# Patient Record
Sex: FEMALE | Race: AMERICAN INDIAN OR ALASKA NATIVE | NOT HISPANIC OR LATINO | Employment: UNEMPLOYED
[De-identification: names, ages, dates, MRNs, and addresses within clinical notes are randomized per-mention and may not be internally consistent; named-entity substitution may affect disease eponyms.]

---

## 2021-06-09 ENCOUNTER — TRANSCRIBE ORDERS (OUTPATIENT)
Dept: OTHER | Age: 26
End: 2021-06-09

## 2021-06-09 DIAGNOSIS — M25.561 PAIN OF RIGHT PATELLOFEMORAL JOINT: Primary | ICD-10-CM

## 2021-06-09 DIAGNOSIS — D18.09: ICD-10-CM

## 2021-12-25 ENCOUNTER — TRANSFERRED RECORDS (OUTPATIENT)
Dept: HEALTH INFORMATION MANAGEMENT | Facility: CLINIC | Age: 26
End: 2021-12-25
Payer: MEDICAID

## 2022-01-06 ENCOUNTER — TRANSCRIBE ORDERS (OUTPATIENT)
Dept: OTHER | Age: 27
End: 2022-01-06

## 2022-01-06 DIAGNOSIS — M25.561 PAIN OF RIGHT PATELLOFEMORAL JOINT: ICD-10-CM

## 2022-01-06 DIAGNOSIS — D18.09: Primary | ICD-10-CM

## 2022-01-20 ENCOUNTER — OFFICE VISIT (OUTPATIENT)
Dept: ORTHOPEDICS | Facility: CLINIC | Age: 27
End: 2022-01-20
Payer: MEDICAID

## 2022-01-20 ENCOUNTER — TELEPHONE (OUTPATIENT)
Dept: ORTHOPEDICS | Facility: CLINIC | Age: 27
End: 2022-01-20

## 2022-01-20 ENCOUNTER — TRANSFERRED RECORDS (OUTPATIENT)
Dept: HEALTH INFORMATION MANAGEMENT | Facility: CLINIC | Age: 27
End: 2022-01-20

## 2022-01-20 DIAGNOSIS — M25.561 PAIN OF RIGHT PATELLOFEMORAL JOINT: ICD-10-CM

## 2022-01-20 DIAGNOSIS — D18.09: ICD-10-CM

## 2022-01-20 PROCEDURE — 99203 OFFICE O/P NEW LOW 30 MIN: CPT | Mod: GC | Performed by: ORTHOPAEDIC SURGERY

## 2022-01-20 RX ORDER — MEDROXYPROGESTERONE ACETATE 150 MG/ML
150 INJECTION, SUSPENSION INTRAMUSCULAR
COMMUNITY

## 2022-01-20 NOTE — PROGRESS NOTES
Patient is a 27 yo female with right knee pain. Recent imaging shows findings consistent with a synovial hemangioma.  MRI my assessment she is having significant activity related symptoms.  She is interested in treatment which would include surgical excision.    On exam today her knee is not swollen.  She elicits tenderness along the medial aspect of the right knee joint.    MRI scan confirms the presence of what is most likely a benign vascular tumor involving the medial portion of the right knee extensively.    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    Plan: I recommend surgical treatment.  I discussed the risk and benefits.  She is aware of these.    Patient was seen with Dr. Colvin.  I agree with his assessment and plan.

## 2022-01-20 NOTE — LETTER
1/20/2022         RE: Marichuy Hernandez  [address]  [place] [address]        Dear Colleague,    Thank you for referring your patient, Marichuy Hernandez, to the [facility] [place]. Please see a copy of my visit note below.    Orthopaedic Surgery History & Physical  January 20, 2022    REFERRING PHYSICIAN: No ref. provider found   PRIMARY CARE PHYSICIAN: No primary care provider on file.     Chief Complaint:   Consult (hemangioma of right knee referred by Dr. Alexy Rosales at [facility] )      History of Present Illness:     Marichuy Hernandez is a 26 year old female who presents for evaluation of right knee pain.      X-rays were obtained of her knee which were negative.  MRI was obtained which demonstrated a likely hemangioma and she was referred to Dr. Jethro Pearl clinic today for this reason.    She has had intermittent right knee pain over the last 2years which is been gradually worsening over time.  The pain is worse when she is doing activities or with prolonged p.o., so activity such as going up and down stairs, lifting objects, or sitting in the car for an extended period of time.    Treatment to date has included Tylenol, ibuprofen, bracing.  She has not had any knee injections nor done any physical therapy.    Past Medical History:   No past medical history on file.    Past Surgical History:   No past surgical history on file.    Social History:     Social History     Tobacco Use     Smoking status: Not on file     Smokeless tobacco: Not on file   Substance Use Topics     Alcohol use: Not on file       Family History:   No family history on file.    Allergies:   No Known Allergies    Medications:     No current outpatient medications on file.     No current facility-administered medications for this visit.        Review of Systems:     A 10 point ROS was performed and reviewed. Specific responses to these questions are noted at the end of the  document.    Physical Exam:   Physical Exam Adult:  She walks normally without any abnormalities in gait.  Focused examination of the right lower extremity demonstrates no gross deformity with the skin intact.  There does not appear to be a large knee effusion.   Mild tenderness to palpation at the medial lateral joint lines, as well as around the patella.  Nontender at the tibial tubercle and patella tendon.  Nontender at the bony part of the patella itself.  Full symmetric range of motion  Fires EHL, FHL, GSC, TA.  Sensation grossly intact  Knee is grossly stable to varus, valgus stress, Lachman test, posterior drawer test.  Negative Swati test.    Imaging:   3 view X-ray of the right knee from 12/2021 demonstrates no osseous abnormalities, no fractures no dislocations.  Soft tissues are well-appearing, there may be a mild knee effusion visible on the lateral.    MRI of the right knee is notable for multiple foci of enhancing lesions within the knee joint itself which is continuous with the synovium.  Main locations are in the patellofemoral joint, as well as the posterior knee.  These are intense on T1 and T2 imaging.  Feeding vessels are appreciable.  Serpiginous appearance is present.  This is consistent with a vascular malformation.    Assessment and Plan:   Assessment:  26 year old female with right knee painful tumor x 2 years. MRI consistent with vascular malformation.    Discussed with the patient that based on her MRI findings this lesion is most likely benign.  She is interested in having the lesion removed in an effort to help improve her pain.  I discussed surgery in the form of open excisional biopsy.  Discussed risks of surgery including infection and bleeding.  Benefits of surgery include likely improvement in her right knee pain.  The patient does wish to proceed with the surgery.    Plan:   - MRI w/ and w/o contrast R thigh to look for additional tumors  - Will schedule patient for surgery for  open excisional biopsy knee lesion.    D/w Dr. Latoya Colvin MD  Orthopaedic Surgery, PGY-4  Pager: [phone]    Patient is a 25 yo female with right knee pain. Recent imaging shows findings consistent with a synovial hemangioma.  MRI my assessment she is having significant activity related symptoms.  She is interested in treatment which would include surgical excision.    On exam today her knee is not swollen.  She elicits tenderness along the medial aspect of the right knee joint.    MRI scan confirms the presence of what is most likely a benign vascular tumor involving the medial portion of the right knee extensively.    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    Plan: I recommend surgical treatment.  I discussed the risk and benefits.  She is aware of these.    Patient was seen with Dr. Colvin.  I agree with his assessment and plan.  Donny Klein MD

## 2022-01-20 NOTE — TELEPHONE ENCOUNTER
RN faxed the verification appt to the info below.    Fay Chong RN         Health Call Center    Phone Message    May a detailed message be left on voicemail: yes     Reason for Call Patient called asking for Appointment Verification ( Paper saying She has appointment Today )  for Today Appointment to be faxed to [phone] to [facility]lin Johnson Action Taken: Message routed to:  [facility] ([facility]    Travel Screening: Not Applicable

## 2022-01-20 NOTE — PROGRESS NOTES
Orthopaedic Surgery History & Physical  January 20, 2022    REFERRING PHYSICIAN: No ref. provider found   PRIMARY CARE PHYSICIAN: No primary care provider on file.     Chief Complaint:   Consult (hemangioma of right knee referred by Dr. Alexy Rosales at [facility] )      History of Present Illness:     Marichuy Hernandez is a 26 year old female who presents for evaluation of right knee pain.      X-rays were obtained of her knee which were negative.  MRI was obtained which demonstrated a likely hemangioma and she was referred to Dr. Jethro Pearl clinic today for this reason.    She has had intermittent right knee pain over the last 2years which is been gradually worsening over time.  The pain is worse when she is doing activities or with prolonged p.o., so activity such as going up and down stairs, lifting objects, or sitting in the car for an extended period of time.    Treatment to date has included Tylenol, ibuprofen, bracing.  She has not had any knee injections nor done any physical therapy.    Past Medical History:   No past medical history on file.    Past Surgical History:   No past surgical history on file.    Social History:     Social History     Tobacco Use     Smoking status: Not on file     Smokeless tobacco: Not on file   Substance Use Topics     Alcohol use: Not on file       Family History:   No family history on file.    Allergies:   No Known Allergies    Medications:     No current outpatient medications on file.     No current facility-administered medications for this visit.        Review of Systems:     A 10 point ROS was performed and reviewed. Specific responses to these questions are noted at the end of the document.    Physical Exam:   Physical Exam Adult:  She walks normally without any abnormalities in gait.  Focused examination of the right lower extremity demonstrates no gross deformity with the skin intact.  There does not appear to be a large knee effusion.   Mild tenderness to  palpation at the medial lateral joint lines, as well as around the patella.  Nontender at the tibial tubercle and patella tendon.  Nontender at the bony part of the patella itself.  Full symmetric range of motion  Fires EHL, FHL, GSC, TA.  Sensation grossly intact  Knee is grossly stable to varus, valgus stress, Lachman test, posterior drawer test.  Negative Swati test.    Imaging:   3 view X-ray of the right knee from 12/2021 demonstrates no osseous abnormalities, no fractures no dislocations.  Soft tissues are well-appearing, there may be a mild knee effusion visible on the lateral.    MRI of the right knee is notable for multiple foci of enhancing lesions within the knee joint itself which is continuous with the synovium.  Main locations are in the patellofemoral joint, as well as the posterior knee.  These are intense on T1 and T2 imaging.  Feeding vessels are appreciable.  Serpiginous appearance is present.  This is consistent with a vascular malformation.    Assessment and Plan:   Assessment:  26 year old female with right knee painful tumor x 2 years. MRI consistent with vascular malformation.    Discussed with the patient that based on her MRI findings this lesion is most likely benign.  She is interested in having the lesion removed in an effort to help improve her pain.  I discussed surgery in the form of open excisional biopsy.  Discussed risks of surgery including infection and bleeding.  Benefits of surgery include likely improvement in her right knee pain.  The patient does wish to proceed with the surgery.    Plan:   - MRI w/ and w/o contrast R thigh to look for additional tumors  - Will schedule patient for surgery for open excisional biopsy knee lesion.    D/w Dr. Latoya Colvin MD  Orthopaedic Surgery, PGY-4  Pager: [phone]

## 2022-01-21 ENCOUNTER — TELEPHONE (OUTPATIENT)
Dept: ORTHOPEDICS | Facility: CLINIC | Age: 27
End: 2022-01-21
Payer: MEDICAID

## 2022-01-25 ENCOUNTER — TELEPHONE (OUTPATIENT)
Dept: ORTHOPEDICS | Facility: CLINIC | Age: 27
End: 2022-01-25
Payer: COMMERCIAL

## 2022-01-25 NOTE — TELEPHONE ENCOUNTER
RN called and unable to leave a voice message for patient.  Fabio Calles has the orders but she is not scheduled Yet.

## 2022-01-25 NOTE — TELEPHONE ENCOUNTER
RN called and spoke with imaging department.  They have the orders but the patient has not been scheduled yet.

## 2022-01-25 NOTE — TELEPHONE ENCOUNTER
Attempted to reach patient about scheduling surgery with Dr. Klein. There was no answer and no message was left because voicemail was not set up.

## 2022-01-26 ENCOUNTER — TRANSFERRED RECORDS (OUTPATIENT)
Dept: HEALTH INFORMATION MANAGEMENT | Facility: CLINIC | Age: 27
End: 2022-01-26
Payer: COMMERCIAL

## 2022-01-26 NOTE — TELEPHONE ENCOUNTER
Second attempted to reach patient about scheduling surgery with Dr. Klein. There was no answer and no message was left because voicemail was not set up.

## 2022-02-02 ENCOUNTER — PREP FOR PROCEDURE (OUTPATIENT)
Dept: ORTHOPEDICS | Facility: CLINIC | Age: 27
End: 2022-02-02
Payer: COMMERCIAL

## 2022-02-04 PROBLEM — D18.09: Status: ACTIVE | Noted: 2022-02-04

## 2022-02-04 NOTE — TELEPHONE ENCOUNTER
Patient is scheduled for surgery with Dr. Klein    Spoke with: Marichuy    Date of Surgery: 2/25/2022    Location: [facility]    Informed patient they will need an adult  yes    Pre op with Provider n/a    H&P: Completed by PCP on 1/31/2022    Pre-procedure COVID-19 Test: patient will do this locally    Additional imaging/appointments: n/a    Surgery packet: Given in clinic     Additional comments: n/a

## 2022-02-06 RX ORDER — CEFAZOLIN SODIUM 2 G/50ML
2 SOLUTION INTRAVENOUS SEE ADMIN INSTRUCTIONS
Status: CANCELLED | OUTPATIENT
Start: 2022-02-06

## 2022-02-06 RX ORDER — CEFAZOLIN SODIUM 2 G/50ML
2 SOLUTION INTRAVENOUS
Status: CANCELLED | OUTPATIENT
Start: 2022-02-06

## 2022-02-07 DIAGNOSIS — Z11.59 ENCOUNTER FOR SCREENING FOR OTHER VIRAL DISEASES: Primary | ICD-10-CM

## 2022-02-18 ENCOUNTER — TELEPHONE (OUTPATIENT)
Dept: ORTHOPEDICS | Facility: CLINIC | Age: 27
End: 2022-02-18
Payer: COMMERCIAL

## 2022-02-18 NOTE — LETTER
Verification of Appointment  2022     Seen today: no    Patient:  Marichuy Hernandez  :   1995  MRN:     [id number]  Physician: TERESA KLEIN    The next appointment is scheduled for 22.  At this time she will be having a surgery which is: Removal of right knee tumor with Dr. Klein.  She will have a 2 week post op check with us as well on  22.      If you have any further questions or concerns, please call me at [phone].        Electronically signed by Teresa Klein MD

## 2022-02-18 NOTE — TELEPHONE ENCOUNTER
ATTN:  [facility]     Health Call Center    Phone Message:  Pt would like a CALL BACK from [facility] to address her appointment verification, why pt is having surgery, and recovery time.      May a detailed message be left on voicemail: Yes     Reason for Call: Other: Surgery Verification & Recovery Time:  CALL BACK     Action Taken: Message routed to:  [facility] Team to [facility]    Travel Screening: Not Applicable

## 2022-02-21 ENCOUNTER — TELEPHONE (OUTPATIENT)
Dept: ORTHOPEDICS | Facility: CLINIC | Age: 27
End: 2022-02-21
Payer: COMMERCIAL

## 2022-02-21 NOTE — TELEPHONE ENCOUNTER
RN returned call to Marichuy but was unable to leave a voice message as her mailbox had not been set up yet.

## 2022-02-22 ENCOUNTER — TELEPHONE (OUTPATIENT)
Dept: ORTHOPEDICS | Facility: CLINIC | Age: 27
End: 2022-02-22
Payer: COMMERCIAL

## 2022-02-22 NOTE — TELEPHONE ENCOUNTER
RN called and spoke with Marichuy.  She is asking for help for gas money to come down here for surgery.  She is asking us for an appointment verification to be sent to her for that reason.  RN will do a note and send to her.  Address confirmed in system.  She has no other questions at this time.

## 2022-02-25 ENCOUNTER — HOSPITAL ENCOUNTER (OUTPATIENT)
Facility: AMBULATORY SURGERY CENTER | Age: 27
End: 2022-02-25
Attending: ORTHOPAEDIC SURGERY
Payer: COMMERCIAL

## 2022-02-25 DIAGNOSIS — D18.09: ICD-10-CM

## 2022-12-06 ENCOUNTER — MEDICAL CORRESPONDENCE (OUTPATIENT)
Dept: HEALTH INFORMATION MANAGEMENT | Facility: CLINIC | Age: 27
End: 2022-12-06

## 2022-12-20 ENCOUNTER — TRANSCRIBE ORDERS (OUTPATIENT)
Dept: OTHER | Age: 27
End: 2022-12-20

## 2022-12-20 DIAGNOSIS — M25.561 PATELLAR PAIN, RIGHT: Primary | ICD-10-CM

## 2023-01-12 ENCOUNTER — TELEPHONE (OUTPATIENT)
Dept: ORTHOPEDICS | Facility: CLINIC | Age: 28
End: 2023-01-12

## 2023-01-12 NOTE — LETTER
[address]  [address]  Tel. [phone]    January 12, 2023      To Whom It May Concern:     Marichuy Mary has an upcoming appointment scheduled with me on 1/16/23.     If you have any questions or concerns, please don't hesitate to call.        Sincerely,     Molina Gastelum, PAC

## 2023-01-12 NOTE — TELEPHONE ENCOUNTER
[facility] Call [facility]    Phone Message    May a detailed message be left on voicemail: yes     Reason for Call Patient need a Verification Note to be Fax to [phone] to [facility]. Saying She has a upcoming Appt coming up on Jan 16  Action Taken: Message routed to:  Other: [facility]    Travel Screening: Not Applicable

## 2023-01-12 NOTE — TELEPHONE ENCOUNTER
Letter was written and faxed to [facility] at [phone] per patient request.    Dorinda Mckenna RN   [facility]

## 2023-01-16 ENCOUNTER — ANCILLARY PROCEDURE (OUTPATIENT)
Dept: GENERAL RADIOLOGY | Facility: CLINIC | Age: 28
End: 2023-01-16
Attending: PHYSICIAN ASSISTANT
Payer: COMMERCIAL

## 2023-01-16 ENCOUNTER — OFFICE VISIT (OUTPATIENT)
Dept: ORTHOPEDICS | Facility: CLINIC | Age: 28
End: 2023-01-16
Payer: COMMERCIAL

## 2023-01-16 VITALS
WEIGHT: 112 LBS | SYSTOLIC BLOOD PRESSURE: 94 MMHG | BODY MASS INDEX: 19.84 KG/M2 | DIASTOLIC BLOOD PRESSURE: 60 MMHG | HEIGHT: 63 IN

## 2023-01-16 DIAGNOSIS — D18.09: Primary | ICD-10-CM

## 2023-01-16 DIAGNOSIS — M25.561 RIGHT KNEE PAIN: ICD-10-CM

## 2023-01-16 PROCEDURE — 73564 X-RAY EXAM KNEE 4 OR MORE: CPT | Mod: TC | Performed by: RADIOLOGY

## 2023-01-16 PROCEDURE — 99203 OFFICE O/P NEW LOW 30 MIN: CPT | Performed by: PHYSICIAN ASSISTANT

## 2023-01-16 ASSESSMENT — PAIN SCALES - GENERAL: PAINLEVEL: MODERATE PAIN (4)

## 2023-01-16 NOTE — PROGRESS NOTES
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  She used to play track in school.  Her significant other's name is Lori    She is being seen for   Chief Complaints and History of Present Illnesses   Patient presents with     Knee Pain     Right knee pain     Consult         History of Present Illness:   Mechanism of Injury: No major injury fall or trauma that she can recall.  Location: Right medial knee  Duration of Pain: Patient states 18 years  Rating of Pain: 5 out of 10  Pain Quality: Achy but can be sharp  Pain is better with: Rest  Pain is worse with: Activity or pushing on a very specific spot.  Treatment so far consists of: No formal physical therapy yet, Tylenol that has not helped all that much, ibuprofen 800 mg that has not helped.  Patient was set up to have benign vascular tumor on the medial side of the knee removed by Dr. Klein but had car troubles and did not get it done.   Associated Features: Denies numbness or tingling shooting burning electric pain.  Patient states that sometimes her knee locks and sometimes she gets pinching in her knee and sometimes she gets swelling and instability in her knee.  Patient gets pain when she is sitting and she has noticed AM stiffness she states.  Prior history of related problems: I was able to find in the notes that the patient did have a right knee tumor removal on 2/25/2022 by Dr. Klein.  She had a benign vascular tumor on the medial side of the knee.  Pain is Limiting: Comfort  Here to: Orthopedic consultation  The Pain Has: Been about the same  Additional History: Patient wants to get set up to see Dr. Klein again as he figured out what was going on with her knee and would like to have the surgery she was set up to do February 2022.      Patient's past medical, surgical, social and family histories reviewed.     No  "past medical history on file.     No past surgical history on file.    Medications:  medroxyPROGESTERone (DEPO-PROVERA) 150 MG/ML IM injection, Inject 150 mg into the muscle every 3 months    No current facility-administered medications on file prior to visit.      No Known Allergies    Social History     Occupational History     Not on file   Tobacco Use     Smoking status: Former     Types: Vaping Device     Smokeless tobacco: Current   Substance and Sexual Activity     Alcohol use: Never     Drug use: Yes     Types: Marijuana     Sexual activity: Not on file     No pertinent family history     REVIEW OF SYSTEMS  10 point review systems performed otherwise negative as noted as per history of present illness.    Physical Exam:  Vitals: BP 94/60   Ht 1.588 m (5' 2.5\")   Wt 50.8 kg (112 lb)   BMI 20.16 kg/m    BMI= Body mass index is 20.16 kg/m .    Constitutional: healthy, alert and no acute distress   Psychiatric: mentation appears normal and affect normal/bright  NEURO: no focal deficits, CMS intact right lower extremity   RESP: Normal with easy respirations and no use of accessory muscles to breathe, no audible wheezing or retractions  CV: +2 radial pulse and her hand is warm to palpation.   SKIN: No erythema, rashes, excoriation, or breakdown. No evidence of infection.   MUSCULOSKELETAL:    INSPECTION of right knee: No gross deformities, erythema, edema, ecchymosis, atrophy or fasciculations.     PALPATION: No tenderness medial, lateral, anterior and posterior portion of the knee. No specific joint line tenderness. No increased warmth.  No effusion.     ROM: Passive: Extension full, flexion to 125 . All range of motion without catching, locking or pain.       STRENGTH: 5 out of 5 quad and hamstring.     SPECIAL TEST: Patient has a negative Lachman's negative drawer sign. Patient's knee is stable to varus and valgus stress at 30  of flexion. Patient has a negative Everardo's.   GAIT: non-antalgic  Lymph: no " palpable lymph nodes    Diagnostic Modalities:  X-rays done today showing mild joint space narrowing in the medial compartment and patellofemoral.  More of the patellofemoral wear is on the lateral facet bilaterally.  No fracture no dislocation no tumor and good alignment.    Independent visualization of the images was performed.    Impression: 1.  Right knee hemangioma under the medial patella tendon.  2.  Removal of right knee tumor by Dr. Klein which did not happen on 2/25/2022.    Plan:  All of the above pertinent physical exam and imaging modalities findings was reviewed with Mercedes and her significant other Kaylin    FOCUSED PLAN:  27-year-old female with right knee benign vascular tumor on the medial patella tendon area who was set up to have this tumor removed by Dr. Klein on 2/25/2022 however she had car trouble and did not make it.  Patient states that the pain has continued and she has had this pain for about 18 years.  She would like to get set up with Dr. Klein again to possibly have this tumor removed.  I put in a Ortho  referral to Dr. Klein today.  Follow-up on an as-needed basis.    Re-x-ray on return: No      This note was dictated with Sellsy.    Frederic Gastelum PA-C

## 2023-01-16 NOTE — LETTER
1/16/2023         RE: Marichuy Hernandez  [address]  [facility] [address]        Dear Colleague,    Thank you for referring your patient, Marichuy Hernandez, to the [facility]. Please see a copy of my visit note below.    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  She used to play track in school.  Her significant other's name is Lori    She is being seen for   Chief Complaints and History of Present Illnesses   Patient presents with     Knee Pain     Right knee pain     Consult         History of Present Illness:   Mechanism of Injury: No major injury fall or trauma that she can recall.  Location: Right medial knee  Duration of Pain: Patient states 18 years  Rating of Pain: 5 out of 10  Pain Quality: Achy but can be sharp  Pain is better with: Rest  Pain is worse with: Activity or pushing on a very specific spot.  Treatment so far consists of: No formal physical therapy yet, Tylenol that has not helped all that much, ibuprofen 800 mg that has not helped.  Patient was set up to have benign vascular tumor on the medial side of the knee removed by Dr. Klein but had car troubles and did not get it done.   Associated Features: Denies numbness or tingling shooting burning electric pain.  Patient states that sometimes her knee locks and sometimes she gets pinching in her knee and sometimes she gets swelling and instability in her knee.  Patient gets pain when she is sitting and she has noticed AM stiffness she states.  Prior history of related problems: I was able to find in the notes that the patient did have a right knee tumor removal on 2/25/2022 by Dr. Klein.  She had a benign vascular tumor on the medial side of the knee.  Pain is Limiting: Comfort  Here to: Orthopedic consultation  The Pain Has: Been about the same  Additional History:  "Patient wants to get set up to see Dr. Klein again as he figured out what was going on with her knee and would like to have the surgery she was set up to do February 2022.      Patient's past medical, surgical, social and family histories reviewed.     No past medical history on file.     No past surgical history on file.    Medications:  medroxyPROGESTERone (DEPO-PROVERA) 150 MG/ML IM injection, Inject 150 mg into the muscle every 3 months    No current facility-administered medications on file prior to visit.      No Known Allergies    Social History     Occupational History     Not on file   Tobacco Use     Smoking status: Former     Types: Vaping Device     Smokeless tobacco: Current   Substance and Sexual Activity     Alcohol use: Never     Drug use: Yes     Types: Marijuana     Sexual activity: Not on file     No pertinent family history     REVIEW OF SYSTEMS  10 point review systems performed otherwise negative as noted as per history of present illness.    Physical Exam:  Vitals: BP 94/60   Ht 1.588 m (5' 2.5\")   Wt 50.8 kg (112 lb)   BMI 20.16 kg/m    BMI= Body mass index is 20.16 kg/m .    Constitutional: healthy, alert and no acute distress   Psychiatric: mentation appears normal and affect normal/bright  NEURO: no focal deficits, CMS intact right lower extremity   RESP: Normal with easy respirations and no use of accessory muscles to breathe, no audible wheezing or retractions  CV: +2 radial pulse and her hand is warm to palpation.   SKIN: No erythema, rashes, excoriation, or breakdown. No evidence of infection.   MUSCULOSKELETAL:    INSPECTION of right knee: No gross deformities, erythema, edema, ecchymosis, atrophy or fasciculations.     PALPATION: No tenderness medial, lateral, anterior and posterior portion of the knee. No specific joint line tenderness. No increased warmth.  No effusion.     ROM: Passive: Extension full, flexion to 125 . All range of motion without catching, locking or pain.  "      STRENGTH: 5 out of 5 quad and hamstring.     SPECIAL TEST: Patient has a negative Lachman's negative drawer sign. Patient's knee is stable to varus and valgus stress at 30  of flexion. Patient has a negative Everardo's.   GAIT: non-antalgic  Lymph: no palpable lymph nodes    Diagnostic Modalities:  X-rays done today showing mild joint space narrowing in the medial compartment and patellofemoral.  More of the patellofemoral wear is on the lateral facet bilaterally.  No fracture no dislocation no tumor and good alignment.    Independent visualization of the images was performed.    Impression: 1.  Right knee hemangioma under the medial patella tendon.  2.  Removal of right knee tumor by Dr. Klein which did not happen on 2/25/2022.    Plan:  All of the above pertinent physical exam and imaging modalities findings was reviewed with Mercedes and her significant other Kaylin    FOCUSED PLAN:  27-year-old female with right knee benign vascular tumor on the medial patella tendon area who was set up to have this tumor removed by Dr. Klein on 2/25/2022 however she had car trouble and did not make it.  Patient states that the pain has continued and she has had this pain for about 18 years.  She would like to get set up with Dr. Klein again to possibly have this tumor removed.  I put in a Ortho  referral to Dr. Klein today.  Follow-up on an as-needed basis.    Re-x-ray on return: No      This note was dictated with [facility].    Frederic Gastelum PA-C        Again, thank you for allowing me to participate in the care of your patient.        Sincerely,        Frederic Gastelum PA-C

## 2023-01-18 ENCOUNTER — TELEPHONE (OUTPATIENT)
Dept: ORTHOPEDICS | Facility: CLINIC | Age: 28
End: 2023-01-18
Payer: COMMERCIAL

## 2023-01-18 NOTE — TELEPHONE ENCOUNTER
Health Call Center    Phone Message    May a detailed message be left on voicemail: yes     Reason for Call: Other: Hello,needs to be with DR. KLEIN per Molina Gastelum PA-C, patient was set up on 2/25/2022 to have removal of right knee tumor which she did not have and would like to get set up with Dr. Klein again.. Can you please review and let me know if patient will need a new consult or if the procedure and get scheduled? Thank you     Action Taken: Other: [facility] ortho    Travel Screening: Not Applicable

## 2023-01-20 NOTE — TELEPHONE ENCOUNTER
Attempted to reach patient.  Patient is not available at this time.  Her girlfriend has the phone at work.  Left message that Dr. Klein's office is calling and that I will try back on Monday.

## 2023-01-23 NOTE — TELEPHONE ENCOUNTER
Contacted patient.  Patient was advised that it has been referred to follow pu with Dr. Klein.  Since patient lives in [place], [place] she scheduled a video visit with Dr. Klein on 1/31/23 at 145 pm.  A link was texted to her cell phone to sign up for [facility].  Patient will give us a call if she is unable to sign up successfully for MyChart. Patient had no further questions or concerns at this time.

## 2023-01-31 ENCOUNTER — VIRTUAL VISIT (OUTPATIENT)
Dept: ORTHOPEDICS | Facility: CLINIC | Age: 28
End: 2023-01-31
Payer: COMMERCIAL

## 2023-01-31 DIAGNOSIS — D18.09: ICD-10-CM

## 2023-01-31 PROCEDURE — 99213 OFFICE O/P EST LOW 20 MIN: CPT | Mod: 95 | Performed by: ORTHOPAEDIC SURGERY

## 2023-01-31 NOTE — LETTER
1/31/2023         RE: Marichuy Hernandez  [address]  [facility] [address]        Dear Colleague,    Thank you for referring your patient, Marichuy Hernandez, to the [facility] [place]. Please see a copy of my visit note below.    Diagnosis: Synovial hemangioma right knee, untreated    I met virtually with Marichuy today.  This meeting was by video.  She reports increased discomfort in her right knee particularly with flexing the knee.  She now describes tenderness about the knee as well as locking when she for example gets out of a car.  She denies any recent injuries.    I reviewed her old MRI scan she does have findings which support the diagnosis of synovial hemangioma.  This is to date untreated.    Impression: Persistent symptoms of the right knee synovial hemangioma with possible overlay of meniscal pathology or other intra-articular disease.    Plan: 1.  MRI scan of the right knee to evaluate for synovial hemangioma as well as intra-articular disease.  MRI scan performed in the [place] area.  April to arrange for the MRI.  Contrast is not needed   2.  Pieter will set up the video visit after we have obtained the MRI scan.  Began at 1:54 PM and ended at 2:07 PM.  The total visit was 13 minutes          Donny Klein MD

## 2023-01-31 NOTE — NURSING NOTE
Marichuy is a 27 year old who is being evaluated via a billable video visit.      How would you like to obtain your AVS? MyChart  If the video visit is dropped, the invitation should be resent by: Text to cell phone: [phone]  Will anyone else be joining your video visit? No

## 2023-01-31 NOTE — PATIENT INSTRUCTIONS
Impression: Persistent symptoms of the right knee synovial hemangioma with possible overlay of meniscal pathology or other intra-articular disease.    Plan: 1.  MRI scan of the right knee to evaluate for synovial hemangioma as well as intra-articular disease.  MRI scan performed in the [place] area.  April to arrange for the MRI.  Contrast is not needed   2.  Pieter will set up the video visit after we have obtained the MRI scan.

## 2023-01-31 NOTE — PROGRESS NOTES
Diagnosis: Synovial hemangioma right knee, untreated    I met virtually with Marichuy today.  This meeting was by video.  She reports increased discomfort in her right knee particularly with flexing the knee.  She now describes tenderness about the knee as well as locking when she for example gets out of a car.  She denies any recent injuries.    I reviewed her old MRI scan she does have findings which support the diagnosis of synovial hemangioma.  This is to date untreated.    Impression: Persistent symptoms of the right knee synovial hemangioma with possible overlay of meniscal pathology or other intra-articular disease.    Plan: 1.  MRI scan of the right knee to evaluate for synovial hemangioma as well as intra-articular disease.  MRI scan performed in the [place] area.  April to arrange for the MRI.  Contrast is not needed   2.  Pieter will set up the video visit after we have obtained the MRI scan.  Began at 1:54 PM and ended at 2:07 PM.  The total visit was 13 minutes

## 2023-02-11 ENCOUNTER — HEALTH MAINTENANCE LETTER (OUTPATIENT)
Age: 28
End: 2023-02-11

## 2023-02-21 ENCOUNTER — VIRTUAL VISIT (OUTPATIENT)
Dept: ORTHOPEDICS | Facility: CLINIC | Age: 28
End: 2023-02-21
Payer: COMMERCIAL

## 2023-02-21 DIAGNOSIS — D18.09: Primary | ICD-10-CM

## 2023-02-21 PROCEDURE — 99214 OFFICE O/P EST MOD 30 MIN: CPT | Mod: VID | Performed by: ORTHOPAEDIC SURGERY

## 2023-02-21 NOTE — PATIENT INSTRUCTIONS
Impression: Symptomatic right knee is presumed synovial hemangioma in the knee along with extension into the popliteal fossa and most likely in the VMO.    Plan: 1.  Flakita to inform the patient of the requirements prior to surgery.  2.  Amalia to schedule an outpatient surgery.  Case request form is completed.

## 2023-02-21 NOTE — NURSING NOTE
Attempted to reach patient for pre surgery education teaching.  Unable to leave a voicemail message, no voicemail set up.  Surgery packet sent in the mail to the patient's address in [facility].

## 2023-02-21 NOTE — PROGRESS NOTES
Diagnosis: Synovial hemangioma right knee, untreated     I met with the patient by video to discuss her MRI scan.  Her recent scan shows no significant changes compared to prior scans and that there is a 4 x 4.6 x 10 cm mass that is described involving primarily the anterior and anteromedial aspect of her knee but also somewhat surprisingly in the posterior aspect.    I reviewed the MRI myself.  It could be that her VMO muscle actually is the source of the tumor and is extended into the knee joint as well as into the popliteal fossa.  I discussed this with her.  It is quite clear that she would like to proceed with surgical treatment.  I think this is my recommendation as well.    I did describe the potential complications from surgery including infection tumor recurrence postoperative bleeding and she understands all of these and is excepting of the risks.  All her questions were answered.    Impression: Symptomatic right knee is presumed synovial hemangioma in the knee along with extension into the popliteal fossa and most likely in the VMO.    Plan: 1.  Flakita to inform the patient of the requirements prior to surgery.  2.  Amalia to schedule an outpatient surgery.  Case request form is completed.    This is a video visit.  I reviewed the imaging from 1:52 PM to 1:57 PM.  The video portion of the visit was from 1:58 PM to 2:08 PM.  Total visit was 15 minutes for an established patient.

## 2023-02-21 NOTE — NURSING NOTE
Is the patient currently in the state of [place]? YES    Visit mode:VIDEO    If the visit is dropped, the patient can be reconnected by: VIDEO VISIT: Text to cell phone: [phone]    Will anyone else be joining the visit? NO      How would you like to obtain your AVS? MyChart    Are changes needed to the allergy or medication list? NO    Reason for visit: Follow up after MRI    Shelby Kocher

## 2023-02-21 NOTE — LETTER
2/21/2023         RE: Marichuy Hernandez  [address]  [facility] [address]        Dear Colleague,    Thank you for referring your patient, Marichuy Hernandez, to the [facility] [place]. Please see a copy of my visit note below.    Diagnosis: Synovial hemangioma right knee, untreated     I met with the patient by video to discuss her MRI scan.  Her recent scan shows no significant changes compared to prior scans and that there is a 4 x 4.6 x 10 cm mass that is described involving primarily the anterior and anteromedial aspect of her knee but also somewhat surprisingly in the posterior aspect.    I reviewed the MRI myself.  It could be that her VMO muscle actually is the source of the tumor and is extended into the knee joint as well as into the popliteal fossa.  I discussed this with her.  It is quite clear that she would like to proceed with surgical treatment.  I think this is my recommendation as well.    I did describe the potential complications from surgery including infection tumor recurrence postoperative bleeding and she understands all of these and is excepting of the risks.  All her questions were answered.    Impression: Symptomatic right knee is presumed synovial hemangioma in the knee along with extension into the popliteal fossa and most likely in the VMO.    Plan: 1.  Flakita to inform the patient of the requirements prior to surgery.  2.  Amalia to schedule an outpatient surgery.  Case request form is completed.    This is a video visit.  I reviewed the imaging from 1:52 PM to 1:57 PM.  The video portion of the visit was from 1:58 PM to 2:08 PM.  Total visit was 15 minutes for an established patient.      Donny Klein MD

## 2023-02-22 ENCOUNTER — TELEPHONE (OUTPATIENT)
Dept: ORTHOPEDICS | Facility: CLINIC | Age: 28
End: 2023-02-22
Payer: COMMERCIAL

## 2023-02-22 NOTE — TELEPHONE ENCOUNTER
Patient is scheduled for surgery with Dr. Klein    Spoke with: Marichuy    Date of Surgery: 3/17/23    Location: [facility]    Informed patient they will need an adult  Yes    H&P: Scheduled with PCP    Pre-procedure COVID-19 Test: N/A    Additional imaging/appointments: POP Made    Surgery packet: Mailed    Additional comments: N/A

## 2023-03-03 ENCOUNTER — TRANSFERRED RECORDS (OUTPATIENT)
Dept: HEALTH INFORMATION MANAGEMENT | Facility: CLINIC | Age: 28
End: 2023-03-03
Payer: COMMERCIAL

## 2023-03-16 ENCOUNTER — ANESTHESIA EVENT (OUTPATIENT)
Dept: SURGERY | Facility: AMBULATORY SURGERY CENTER | Age: 28
End: 2023-03-16
Payer: COMMERCIAL

## 2023-03-17 ENCOUNTER — ANESTHESIA (OUTPATIENT)
Dept: SURGERY | Facility: AMBULATORY SURGERY CENTER | Age: 28
End: 2023-03-17
Payer: COMMERCIAL

## 2023-03-17 ENCOUNTER — HOSPITAL ENCOUNTER (OUTPATIENT)
Facility: AMBULATORY SURGERY CENTER | Age: 28
Discharge: HOME | End: 2023-03-17
Attending: ORTHOPAEDIC SURGERY
Payer: COMMERCIAL

## 2023-03-17 VITALS
WEIGHT: 117 LBS | TEMPERATURE: 97.5 F | BODY MASS INDEX: 20.73 KG/M2 | HEART RATE: 84 BPM | RESPIRATION RATE: 16 BRPM | HEIGHT: 63 IN | SYSTOLIC BLOOD PRESSURE: 107 MMHG | DIASTOLIC BLOOD PRESSURE: 64 MMHG | OXYGEN SATURATION: 97 %

## 2023-03-17 DIAGNOSIS — D18.09: Primary | ICD-10-CM

## 2023-03-17 LAB
HCG UR QL: NEGATIVE
INTERNAL QC OK POCT: NORMAL
POCT KIT EXPIRATION DATE: NORMAL
POCT KIT LOT NUMBER: NORMAL

## 2023-03-17 PROCEDURE — 88307 TISSUE EXAM BY PATHOLOGIST: CPT | Mod: TC | Performed by: ORTHOPAEDIC SURGERY

## 2023-03-17 PROCEDURE — 27334 REMOVE KNEE JOINT LINING: CPT | Mod: RT

## 2023-03-17 PROCEDURE — 88307 TISSUE EXAM BY PATHOLOGIST: CPT | Mod: 26 | Performed by: PATHOLOGY

## 2023-03-17 PROCEDURE — 81025 URINE PREGNANCY TEST: CPT | Performed by: PATHOLOGY

## 2023-03-17 PROCEDURE — 27334 REMOVE KNEE JOINT LINING: CPT | Mod: RT | Performed by: ORTHOPAEDIC SURGERY

## 2023-03-17 RX ORDER — KETOROLAC TROMETHAMINE 30 MG/ML
INJECTION, SOLUTION INTRAMUSCULAR; INTRAVENOUS PRN
Status: DISCONTINUED | OUTPATIENT
Start: 2023-03-17 | End: 2023-03-17

## 2023-03-17 RX ORDER — ONDANSETRON 4 MG/1
4 TABLET, ORALLY DISINTEGRATING ORAL EVERY 30 MIN PRN
Status: DISCONTINUED | OUTPATIENT
Start: 2023-03-17 | End: 2023-03-17 | Stop reason: HOSPADM

## 2023-03-17 RX ORDER — SODIUM CHLORIDE, SODIUM LACTATE, POTASSIUM CHLORIDE, CALCIUM CHLORIDE 600; 310; 30; 20 MG/100ML; MG/100ML; MG/100ML; MG/100ML
INJECTION, SOLUTION INTRAVENOUS CONTINUOUS
Status: DISCONTINUED | OUTPATIENT
Start: 2023-03-17 | End: 2023-03-17 | Stop reason: HOSPADM

## 2023-03-17 RX ORDER — FENTANYL CITRATE 50 UG/ML
50 INJECTION, SOLUTION INTRAMUSCULAR; INTRAVENOUS EVERY 5 MIN PRN
Status: DISCONTINUED | OUTPATIENT
Start: 2023-03-17 | End: 2023-03-17 | Stop reason: HOSPADM

## 2023-03-17 RX ORDER — LIDOCAINE 40 MG/G
CREAM TOPICAL
Status: DISCONTINUED | OUTPATIENT
Start: 2023-03-17 | End: 2023-03-17 | Stop reason: HOSPADM

## 2023-03-17 RX ORDER — HYDROMORPHONE HYDROCHLORIDE 1 MG/ML
0.2 INJECTION, SOLUTION INTRAMUSCULAR; INTRAVENOUS; SUBCUTANEOUS EVERY 5 MIN PRN
Status: DISCONTINUED | OUTPATIENT
Start: 2023-03-17 | End: 2023-03-17 | Stop reason: HOSPADM

## 2023-03-17 RX ORDER — LIDOCAINE HYDROCHLORIDE 20 MG/ML
INJECTION, SOLUTION INFILTRATION; PERINEURAL PRN
Status: DISCONTINUED | OUTPATIENT
Start: 2023-03-17 | End: 2023-03-17

## 2023-03-17 RX ORDER — OXYCODONE HYDROCHLORIDE 5 MG/1
5-10 TABLET ORAL EVERY 4 HOURS PRN
Qty: 10 TABLET | Refills: 0 | Status: SHIPPED | OUTPATIENT
Start: 2023-03-17 | End: 2023-04-04

## 2023-03-17 RX ORDER — GABAPENTIN 300 MG/1
300 CAPSULE ORAL
Status: COMPLETED | OUTPATIENT
Start: 2023-03-17 | End: 2023-03-17

## 2023-03-17 RX ORDER — BUPIVACAINE HYDROCHLORIDE AND EPINEPHRINE 2.5; 5 MG/ML; UG/ML
INJECTION, SOLUTION INFILTRATION; PERINEURAL PRN
Status: DISCONTINUED | OUTPATIENT
Start: 2023-03-17 | End: 2023-03-17 | Stop reason: HOSPADM

## 2023-03-17 RX ORDER — ONDANSETRON 2 MG/ML
4 INJECTION INTRAMUSCULAR; INTRAVENOUS EVERY 30 MIN PRN
Status: DISCONTINUED | OUTPATIENT
Start: 2023-03-17 | End: 2023-03-17 | Stop reason: HOSPADM

## 2023-03-17 RX ORDER — CEFAZOLIN SODIUM 2 G/50ML
2 SOLUTION INTRAVENOUS
Status: COMPLETED | OUTPATIENT
Start: 2023-03-17 | End: 2023-03-17

## 2023-03-17 RX ORDER — FENTANYL CITRATE 50 UG/ML
25 INJECTION, SOLUTION INTRAMUSCULAR; INTRAVENOUS EVERY 5 MIN PRN
Status: DISCONTINUED | OUTPATIENT
Start: 2023-03-17 | End: 2023-03-17 | Stop reason: HOSPADM

## 2023-03-17 RX ORDER — HYDROMORPHONE HYDROCHLORIDE 1 MG/ML
0.4 INJECTION, SOLUTION INTRAMUSCULAR; INTRAVENOUS; SUBCUTANEOUS EVERY 5 MIN PRN
Status: DISCONTINUED | OUTPATIENT
Start: 2023-03-17 | End: 2023-03-17 | Stop reason: HOSPADM

## 2023-03-17 RX ORDER — AMOXICILLIN 250 MG
1-2 CAPSULE ORAL 2 TIMES DAILY
Qty: 30 TABLET | Refills: 0 | Status: SHIPPED | OUTPATIENT
Start: 2023-03-17 | End: 2023-04-04

## 2023-03-17 RX ORDER — GLYCOPYRROLATE 0.2 MG/ML
INJECTION, SOLUTION INTRAMUSCULAR; INTRAVENOUS PRN
Status: DISCONTINUED | OUTPATIENT
Start: 2023-03-17 | End: 2023-03-17

## 2023-03-17 RX ORDER — DEXAMETHASONE SODIUM PHOSPHATE 4 MG/ML
INJECTION, SOLUTION INTRA-ARTICULAR; INTRALESIONAL; INTRAMUSCULAR; INTRAVENOUS; SOFT TISSUE PRN
Status: DISCONTINUED | OUTPATIENT
Start: 2023-03-17 | End: 2023-03-17

## 2023-03-17 RX ORDER — ONDANSETRON 4 MG/1
4 TABLET, ORALLY DISINTEGRATING ORAL EVERY 8 HOURS PRN
Qty: 4 TABLET | Refills: 0 | Status: SHIPPED | OUTPATIENT
Start: 2023-03-17 | End: 2023-04-04

## 2023-03-17 RX ORDER — PROPOFOL 10 MG/ML
INJECTION, EMULSION INTRAVENOUS CONTINUOUS PRN
Status: DISCONTINUED | OUTPATIENT
Start: 2023-03-17 | End: 2023-03-17

## 2023-03-17 RX ORDER — CEFAZOLIN SODIUM 2 G/50ML
2 SOLUTION INTRAVENOUS SEE ADMIN INSTRUCTIONS
Status: DISCONTINUED | OUTPATIENT
Start: 2023-03-17 | End: 2023-03-17 | Stop reason: HOSPADM

## 2023-03-17 RX ORDER — ACETAMINOPHEN 325 MG/1
650 TABLET ORAL EVERY 4 HOURS PRN
Qty: 50 TABLET | Refills: 0 | Status: SHIPPED | OUTPATIENT
Start: 2023-03-17

## 2023-03-17 RX ORDER — OXYCODONE HYDROCHLORIDE 5 MG/1
5 TABLET ORAL ONCE
Status: ACTIVE | OUTPATIENT
Start: 2023-03-17

## 2023-03-17 RX ORDER — MAGNESIUM HYDROXIDE 1200 MG/15ML
LIQUID ORAL PRN
Status: DISCONTINUED | OUTPATIENT
Start: 2023-03-17 | End: 2023-03-17 | Stop reason: HOSPADM

## 2023-03-17 RX ORDER — OXYCODONE HYDROCHLORIDE 5 MG/1
5 TABLET ORAL
Status: COMPLETED | OUTPATIENT
Start: 2023-03-17 | End: 2023-03-17

## 2023-03-17 RX ORDER — ONDANSETRON 2 MG/ML
INJECTION INTRAMUSCULAR; INTRAVENOUS PRN
Status: DISCONTINUED | OUTPATIENT
Start: 2023-03-17 | End: 2023-03-17

## 2023-03-17 RX ORDER — PROPOFOL 10 MG/ML
INJECTION, EMULSION INTRAVENOUS PRN
Status: DISCONTINUED | OUTPATIENT
Start: 2023-03-17 | End: 2023-03-17

## 2023-03-17 RX ORDER — FENTANYL CITRATE 50 UG/ML
INJECTION, SOLUTION INTRAMUSCULAR; INTRAVENOUS PRN
Status: DISCONTINUED | OUTPATIENT
Start: 2023-03-17 | End: 2023-03-17

## 2023-03-17 RX ORDER — ACETAMINOPHEN 325 MG/1
975 TABLET ORAL ONCE
Status: COMPLETED | OUTPATIENT
Start: 2023-03-17 | End: 2023-03-17

## 2023-03-17 RX ORDER — ONDANSETRON 4 MG/1
4 TABLET, ORALLY DISINTEGRATING ORAL
Status: DISCONTINUED | OUTPATIENT
Start: 2023-03-17 | End: 2023-03-18 | Stop reason: HOSPADM

## 2023-03-17 RX ORDER — HYDROXYZINE HYDROCHLORIDE 25 MG/1
25 TABLET, FILM COATED ORAL
Status: DISCONTINUED | OUTPATIENT
Start: 2023-03-17 | End: 2023-03-18 | Stop reason: HOSPADM

## 2023-03-17 RX ORDER — ACETAMINOPHEN 325 MG/1
650 TABLET ORAL
Status: DISCONTINUED | OUTPATIENT
Start: 2023-03-17 | End: 2023-03-18 | Stop reason: HOSPADM

## 2023-03-17 RX ADMIN — KETOROLAC TROMETHAMINE 30 MG: 30 INJECTION, SOLUTION INTRAMUSCULAR; INTRAVENOUS at 10:10

## 2023-03-17 RX ADMIN — PROPOFOL 120 MG: 10 INJECTION, EMULSION INTRAVENOUS at 09:11

## 2023-03-17 RX ADMIN — FENTANYL CITRATE 50 MCG: 50 INJECTION, SOLUTION INTRAMUSCULAR; INTRAVENOUS at 10:50

## 2023-03-17 RX ADMIN — GABAPENTIN 300 MG: 300 CAPSULE ORAL at 08:44

## 2023-03-17 RX ADMIN — FENTANYL CITRATE 25 MCG: 50 INJECTION, SOLUTION INTRAMUSCULAR; INTRAVENOUS at 10:58

## 2023-03-17 RX ADMIN — OXYCODONE HYDROCHLORIDE 5 MG: 5 TABLET ORAL at 10:58

## 2023-03-17 RX ADMIN — PROPOFOL 200 MCG/KG/MIN: 10 INJECTION, EMULSION INTRAVENOUS at 09:12

## 2023-03-17 RX ADMIN — CEFAZOLIN SODIUM 2 G: 2 SOLUTION INTRAVENOUS at 09:05

## 2023-03-17 RX ADMIN — SODIUM CHLORIDE, SODIUM LACTATE, POTASSIUM CHLORIDE, CALCIUM CHLORIDE: 600; 310; 30; 20 INJECTION, SOLUTION INTRAVENOUS at 08:44

## 2023-03-17 RX ADMIN — FENTANYL CITRATE 50 MCG: 50 INJECTION, SOLUTION INTRAMUSCULAR; INTRAVENOUS at 09:26

## 2023-03-17 RX ADMIN — ONDANSETRON 4 MG: 2 INJECTION INTRAMUSCULAR; INTRAVENOUS at 09:08

## 2023-03-17 RX ADMIN — FENTANYL CITRATE 50 MCG: 50 INJECTION, SOLUTION INTRAMUSCULAR; INTRAVENOUS at 09:11

## 2023-03-17 RX ADMIN — ACETAMINOPHEN 975 MG: 325 TABLET ORAL at 08:44

## 2023-03-17 RX ADMIN — GLYCOPYRROLATE 0.2 MG: 0.2 INJECTION, SOLUTION INTRAMUSCULAR; INTRAVENOUS at 09:08

## 2023-03-17 RX ADMIN — DEXAMETHASONE SODIUM PHOSPHATE 4 MG: 4 INJECTION, SOLUTION INTRA-ARTICULAR; INTRALESIONAL; INTRAMUSCULAR; INTRAVENOUS; SOFT TISSUE at 09:08

## 2023-03-17 RX ADMIN — Medication 0.5 MG: at 09:37

## 2023-03-17 RX ADMIN — FENTANYL CITRATE 25 MCG: 50 INJECTION, SOLUTION INTRAMUSCULAR; INTRAVENOUS at 11:07

## 2023-03-17 RX ADMIN — ONDANSETRON 4 MG: 2 INJECTION INTRAMUSCULAR; INTRAVENOUS at 11:22

## 2023-03-17 RX ADMIN — LIDOCAINE HYDROCHLORIDE 60 MG: 20 INJECTION, SOLUTION INFILTRATION; PERINEURAL at 09:11

## 2023-03-17 ASSESSMENT — LIFESTYLE VARIABLES: TOBACCO_USE: 1

## 2023-03-17 NOTE — OP NOTE
Preop diagnosis: Synovial base tumor right knee, suspect synovial hemangioma    Postoperative diagnosis: Same    Procedure performed: Removal of right synovial base tumor with Partial open synovectomy.    Surgeons: Donny Klein and Lindsay Byrne    Estimated blood loss: 100 cc    Pathology submitted: Tumor right knee in ant Benedict was interviewed in the preoperative area.  Risk and benefits have been reviewed previously.  The consent was signed the surgical site was marked with my initials and line of intended incision.    Patient was taken the operating room preoperative briefing been performed.  She received a general anesthetic.  In a supine position the right leg was prepped and draped sterilely.  Surgical timeout was performed.    A standard incision was made from the medial approach to the knee Sharp dissection was taken down to the retinaculum quadriceps tendon.  An arthrotomy was performed as well as an incision distal medial portion of the quadriceps tendon.  This provided excellent visualization of the wound.  The synovium underlying the tissues along the lateral aspect of the wound including the infrapatellar region, suprapatellar region, and portions of the lateral aspect of the knee was removed.  Using a combination of sharp and cautery dissection of the synovium associated tumor was removed from the medial flap which required dissection down to almost the linea aspera along the medial side of the knee and to the medial collateral ligament.    The tourniquet was let down hemostasis was obtained.  There was some residual bleeding in the region of the medial gutter.  This was attributed to residual tumor within the VMO.  That portion of the wound efforts were made to maintain hemostasis.  It was then packed with thrombin-soaked Gelfoam and at the time of wound closure hemostasis was acceptable.    At the time of wound closure was noted that the incision in the quadriceps tendon was more  transverse than intended.  The quadriceps tendon was still intact but the medial 50% had been incised.  This was repaired with a proximal to distal  [place] suture repair and then side to side with interrupted nonabsorbable sutures.  The remaining portion of the retinaculum was closed as was the skin and subcutaneous tissue in a standard fashion.    Postoperative debrief was performed.    Postoperative plan: 1.  Because of the need to protect and heal the quadriceps tendon repair patient will be wearing a knee brace.  She is to walk with the knee in full extension and allowed to flex in the brace to 45 degrees.  She will be expected to wear the brace as described for minimum of 4 weeks.    2.  Should be seen back as scheduled for wound inspection  3.  We will contact her with the biopsy results if they are different than a hemangioma or venous malformation.

## 2023-03-17 NOTE — ANESTHESIA CARE TRANSFER NOTE
Patient: Marichuy Hernandez    Procedure: Procedure(s):  Removal of tumor right knee.       Diagnosis: Hemangioma of synovium [D18.09]  Diagnosis Additional Information: No value filed.    Anesthesia Type:   General     Note:    Oropharynx: oropharynx clear of all foreign objects and spontaneously breathing  Level of Consciousness: awake  Oxygen Supplementation: room air    Independent Airway: airway patency satisfactory and stable  Dentition: dentition unchanged  Vital Signs Stable: post-procedure vital signs reviewed and stable  Report to RN Given: handoff report given  Patient transferred to: PACU    Handoff Report: Identifed the Patient, Identified the Reponsible Provider, Reviewed the pertinent medical history, Discussed the surgical course, Reviewed Intra-OP anesthesia mangement and issues during anesthesia, Set expectations for post-procedure period and Allowed opportunity for questions and acknowledgement of understanding      Vitals:  Vitals Value Taken Time   BP     Temp     Pulse 68 03/17/23 1048   Resp 10 03/17/23 1048   SpO2 97 % 03/17/23 1048   Vitals shown include unvalidated device data.    Electronically Signed By: YOSELIN Maza CRNA  March 17, 2023  10:49 AM

## 2023-03-17 NOTE — ANESTHESIA PREPROCEDURE EVALUATION
Anesthesia Pre-Procedure Evaluation    Patient: Marichuy Hernandez   MRN: [id number] : 1995        Procedure : Procedure(s):  Removal of tumor right knee.          No past medical history on file.   No past surgical history on file.   No Known Allergies   Social History     Tobacco Use     Smoking status: Former     Types: Vaping Device     Smokeless tobacco: Current   Substance Use Topics     Alcohol use: Never      Wt Readings from Last 1 Encounters:   23 53.1 kg (117 lb)        Anesthesia Evaluation   Pt has had prior anesthetic.     No history of anesthetic complications       ROS/MED HX  ENT/Pulmonary:     (+) tobacco use,     Neurologic:  - neg neurologic ROS     Cardiovascular:  - neg cardiovascular ROS     METS/Exercise Tolerance:     Hematologic:  - neg hematologic  ROS     Musculoskeletal: Comment: Right knee hemangioma      GI/Hepatic:  - neg GI/hepatic ROS     Renal/Genitourinary:  - neg Renal ROS     Endo:  - neg endo ROS     Psychiatric/Substance Use:  - neg psychiatric ROS     Infectious Disease:  - neg infectious disease ROS     Malignancy:  - neg malignancy ROS     Other:               OUTSIDE LABS:  CBC: No results found for: WBC, HGB, HCT, PLT  BMP: No results found for: NA, POTASSIUM, CHLORIDE, CO2, BUN, CR, GLC  COAGS: No results found for: PTT, INR, FIBR  POC:   Lab Results   Component Value Date    HCG Negative 2023     HEPATIC: No results found for: ALBUMIN, PROTTOTAL, ALT, AST, GGT, ALKPHOS, BILITOTAL, BILIDIRECT, PEACE  OTHER: No results found for: PH, LACT, A1C, MILLA, PHOS, MAG, LIPASE, AMYLASE, TSH, T4, T3, CRP, SED    Anesthesia Plan    ASA Status:  1   NPO Status:  NPO Appropriate    Anesthesia Type: General.     - Airway: LMA   Induction: Intravenous.   Maintenance: Balanced.        Consents    Anesthesia Plan(s) and associated risks, benefits, and realistic alternatives discussed. Questions answered and patient/representative(s) expressed understanding.    -  Discussed:     - Discussed with:  Patient         Postoperative Care    Pain management: IV analgesics, Oral pain medications.   PONV prophylaxis: Ondansetron (or other 5HT-3), Dexamethasone or Solumedrol, Background Propofol Infusion     Comments:           H&P reviewed: Unable to attach H&P to encounter due to EHR limitations. H&P Update: appropriate H&P reviewed, patient examined. No interval changes since H&P (within 30 days).         Lauren Love MD

## 2023-03-17 NOTE — ANESTHESIA POSTPROCEDURE EVALUATION
Patient: Marichuy Hernandez    Procedure: Procedure(s):  Removal of tumor right knee.       Anesthesia Type:  General    Note:  Disposition: Outpatient   Postop Pain Control: Uneventful            Sign Out: Well controlled pain   PONV: No   Neuro/Psych: Uneventful            Sign Out: Acceptable/Baseline neuro status   Airway/Respiratory: Uneventful            Sign Out: Acceptable/Baseline resp. status   CV/Hemodynamics: Uneventful            Sign Out: Acceptable CV status; No obvious hypovolemia; No obvious fluid overload   Other NRE: NONE   DID A NON-ROUTINE EVENT OCCUR? No           Last vitals:  Vitals Value Taken Time   /60 03/17/23 1112   Temp 36.4  C (97.5  F) 03/17/23 1047   Pulse 59 03/17/23 1112   Resp 17 03/17/23 1111   SpO2 97 % 03/17/23 1112   Vitals shown include unvalidated device data.    Electronically Signed By: Lauren Love MD  March 17, 2023  11:54 AM

## 2023-03-17 NOTE — DISCHARGE INSTRUCTIONS
"    Safety Tips for Using Crutches    Crutch Fit:  Assume good standing posture with shoulders relaxed and crutch tips 6-8 inches out from the side of the foot.  The underarm pad should fall 2-3 fingers width below the armpit.  The handgrip is positioned level with the wrist to allow 30  flexion at the elbow.    Safety Tips:  Bear weight on your hands, not on your armpits.  Do not add extra padding to the underarm pad. This will, in effect, lengthen the crutches and increase risk of nerve injury.  Wear flat, properly fitting shoes. Do not walk in stocking feet, high heels or slippers.  Household hazards:  --Throw rugs should be removed from floors.  --Stairs should be cleared of obstacles.  --Use extra caution on slippery, highly polished, littered or uneven floor surfaces.  --Check for electric cords.  Check crutch tips for excessive wear and keep wing nuts tight.  While walking, look forward with  head up  and  eyes open.  Take equal length steps.  Use BOTH crutches.    Stairs Sequence:  UP: \"Good\" leg first, followed by  bad  leg, then crutches.  DOWN: Crutches, followed by  bad  leg, \"good\" leg.     Walking with Crutches:  Move both crutches forward at the same time.  Non-Weight Bearing (NWB):  Hold the involved leg up and swing through the crutches with the involved leg. The involved leg does not touch the floor.  Toe Touch Weight Bearing (TTWB): Move the involved leg forward. Rest it lightly on the floor for balance only. Step through the crutches with the uninvolved leg.  Partial Weight Bearing (PWB): Move the involved leg forward. Step down the weight of the leg only.  Step through the crutches with the uninvolved leg.  Weight Bearing As Tolerated (WBAT): Move the involved leg forward. Put as much pressure through the involved leg as you can tolerate comfortably. Then step through the crutches with the uninvolved leg.  [facility]  Home Care Following Anesthesia  For 24 " hours after surgery:  Get plenty of rest.  A responsible adult must stay with you for at least 24 hours after you leave the surgery center.  Do not drive or use heavy equipment.  If you have weakness or tingling, don't drive or use heavy equipment until this feeling goes away.   Do not drink alcohol.   Avoid strenuous or risky activities.  Ask for help when climbing stairs.  You may feel lightheaded.  IF so, sit for a few minutes before standing.  Have someone help you get up.   If you have nausea (feel sick to your stomach): Drink only clear liquids such as apple juice, ginger ale, broth or 7-Up.  Rest may also help.  Be sure to drink enough fluids.  Move to a regular diet as you feel able.   You may have a slight fever.  Call the doctor if your fever is over 100 F (37.7 C) (taken under the tongue) or lasts longer than 24 hours.  You may have a dry mouth, a sore throat, muscle aches or trouble sleeping. These should go away after 24 hours.  Do not make important or legal decisions.   It is recommended to avoid smoking.               Tips for taking pain medications  To get the best pain relief possible, remember these points:  Take pain medications as directed, before pain becomes severe.  Pain medication can upset your stomach: taking it with food may help.  Constipation is a common side effect of pain medication. Drink plenty of  fluids.  Eat foods high in fiber. Take a stool softener if recommended by your doctor or pharmacist.  Do not drink alcohol, drive or operate machinery while taking pain medications.  Ask about other ways to control pain, such as with heat, ice or relaxation.    Tylenol/Acetaminophen Consumption  To help encourage the safe use of acetaminophen, the makers of TYLENOL  have lowered the maximum daily dose for single-ingredient Extra Strength TYLENOL  (acetaminophen) products sold in the [place] from 8 pills per day (4,000 mg) to 6 pills per day (3,000 mg). The dosing interval has also changed  from 2 pills every 4-6 hours to 2 pills every 6 hours.  If you feel your pain relief is insufficient, you may take Tylenol/Acetaminophen in addition to your narcotic pain medication.   Be careful not to exceed 3,000 mg of Tylenol/Acetaminophen in a 24 hour period from all sources.  If you are taking extra strength Tylenol/acetaminophen (500 mg), the maximum dose is 6 tablets in 24 hours.  If you are taking regular strength acetaminophen (325 mg), the maximum dose is 9 tablets in 24 hours.  You were last given 975mg of Tylenol at 8:45 am, you may take Tylenol again at 2:45 pm.     Call a doctor for any of the following:  Signs of infection (fever, growing tenderness at the surgery site, a large amount of drainage or bleeding, severe pain, foul-smelling drainage, redness, swelling).  It has been over 8 to 10 hours since surgery and you are still not able to urinate (pass water).  Headache for over 24 hours.  Numbness, tingling or weakness the day after surgery (if you had spinal anesthesia).  Signs of Covid-19 infection (temperature over 100 degrees, shortness of breath, cough, loss of taste/smell, generalized body aches, persistent headache, chills, sore throat, nausea/vomiting/diarrhea)  Your doctor is:  Dr. Donny Klein, Orthopaedics: [phone]                    Or dial [phone] and ask for the resident on call for:  Orthopaedics  For emergency care, call the:  [facility] Emergency Department: [phone] (TTY for hearing impaired: [phone])

## 2023-03-21 LAB
PATH REPORT.COMMENTS IMP SPEC: NORMAL
PATH REPORT.COMMENTS IMP SPEC: NORMAL
PATH REPORT.FINAL DX SPEC: NORMAL
PATH REPORT.GROSS SPEC: NORMAL
PATH REPORT.MICROSCOPIC SPEC OTHER STN: NORMAL
PATH REPORT.RELEVANT HX SPEC: NORMAL
PHOTO IMAGE: NORMAL

## 2023-04-04 ENCOUNTER — VIRTUAL VISIT (OUTPATIENT)
Dept: ORTHOPEDICS | Facility: CLINIC | Age: 28
End: 2023-04-04
Payer: COMMERCIAL

## 2023-04-04 DIAGNOSIS — D18.09 HEMANGIOMA OF OTHER SITES: Primary | ICD-10-CM

## 2023-04-04 PROCEDURE — 99024 POSTOP FOLLOW-UP VISIT: CPT | Mod: VID | Performed by: ORTHOPAEDIC SURGERY

## 2023-04-04 NOTE — NURSING NOTE
Is the patient currently in the state of [place]? YES    Visit mode:VIDEO    If the visit is dropped, the patient can be reconnected by: VIDEO VISIT: Text to cell phone: [phone]    Will anyone else be joining the visit? NO      How would you like to obtain your AVS? MyChart    Are changes needed to the allergy or medication list? NO    Reason for visit:   Chief Complaint   Patient presents with     Video Visit     Follow-up, DOS 3/17/23

## 2023-04-04 NOTE — PROGRESS NOTES
Diagnosis: Synovial hemangioma right knee.    Treatment: Surgical excision March 17, 2023    Marichuy was interviewed and visualized during the video visit.  She reports in general her pain now is different than preoperatively.  She feels things are headed in the right direction.  She does describe pain above her patella.  This is particularly with firing her quadriceps.  She is continue to wear the brace allowing the range from full extension to 45 degrees of flexion    I examined her wound by video it is completely healed and dry.    I reviewed her histopathology which is a synovial hemangioma.    Diagnosis: Synovial hemangioma right knee.    Treatment: Surgical excision March 17, 2023    Plan: 1.  Flakita to fax physical therapy prescription to the [facility].  The instruction should be for physical therapy with right knee quadriceps and hamstring strengthening.  Therapy to be formed in the brace with no motion beyond the arc of 0 to 45 degrees of flexion.  2.  Face-to-face follow-up visit in 3 weeks at which time based on symptoms and physical exam we will consider increasing the allowable motion    This video visit began at 1:30 PM and ended at 1:37 PM.

## 2023-04-04 NOTE — PATIENT INSTRUCTIONS
Diagnosis: Synovial hemangioma right knee.    Treatment: Surgical excision March 17, 2023    Plan: 1.  Flakita bailey physical therapy prescription to the [facility].  The instruction should be for physical therapy with right knee quadriceps and hamstring strengthening.  Therapy to be formed in the brace with no motion beyond the arc of 0 to 45 degrees of flexion.  2.  Face-to-face follow-up visit in 3 weeks at which time based on symptoms and physical exam we will consider increasing the allowable motion

## 2023-04-04 NOTE — LETTER
4/4/2023         RE: Marichuy Hernandez  [address]  [facility] [address]        Dear Colleague,    Thank you for referring your patient, Mairchuy Hernandez, to the [facility] [place]. Please see a copy of my visit note below.    Diagnosis: Synovial hemangioma right knee.    Treatment: Surgical excision March 17, 2023    Marichuy was interviewed and visualized during the video visit.  She reports in general her pain now is different than preoperatively.  She feels things are headed in the right direction.  She does describe pain above her patella.  This is particularly with firing her quadriceps.  She is continue to wear the brace allowing the range from full extension to 45 degrees of flexion    I examined her wound by video it is completely healed and dry.    I reviewed her histopathology which is a synovial hemangioma.    Diagnosis: Synovial hemangioma right knee.    Treatment: Surgical excision March 17, 2023    Plan: 1.  Flakita to fax physical therapy prescription to the [facility].  The instruction should be for physical therapy with right knee quadriceps and hamstring strengthening.  Therapy to be formed in the brace with no motion beyond the arc of 0 to 45 degrees of flexion.  2.  Face-to-face follow-up visit in 3 weeks at which time based on symptoms and physical exam we will consider increasing the allowable motion    This video visit began at 1:30 PM and ended at 1:37 PM.      Again, thank you for allowing me to participate in the care of your patient.        Sincerely,        Donny Klein MD

## 2023-04-06 DIAGNOSIS — D18.09: Primary | ICD-10-CM

## 2023-04-07 ENCOUNTER — TELEPHONE (OUTPATIENT)
Dept: ORTHOPEDICS | Facility: CLINIC | Age: 28
End: 2023-04-07
Payer: COMMERCIAL

## 2023-04-07 NOTE — TELEPHONE ENCOUNTER
"PT orders were faxed to [facility] [phone]. Patient was called to set up follow up appointment. Phone did not ring. Message stated \"the person you are trying to reach has a voice mailbox that is not set up yet.\" Patient needs to be told her PT orders were faxed to [facility] and needs to set up a 3 week appointment with Dr. Klein.    Shelley Boyd LPN       "

## 2023-04-17 ENCOUNTER — DOCUMENTATION ONLY (OUTPATIENT)
Dept: ORTHOPEDICS | Facility: CLINIC | Age: 28
End: 2023-04-17
Payer: COMMERCIAL

## 2023-04-17 NOTE — PROGRESS NOTES
Received Completed forms Yes   Faxed Forms Faxed To: [facility]  Fax Number: [phone]   Sent to [facility] (Date) 4/14/23

## 2023-05-04 ENCOUNTER — OFFICE VISIT (OUTPATIENT)
Dept: ORTHOPEDICS | Facility: CLINIC | Age: 28
End: 2023-05-04
Payer: COMMERCIAL

## 2023-05-04 DIAGNOSIS — D18.09: Primary | ICD-10-CM

## 2023-05-04 PROCEDURE — 99024 POSTOP FOLLOW-UP VISIT: CPT | Performed by: PHYSICIAN ASSISTANT

## 2023-05-04 NOTE — PATIENT INSTRUCTIONS
Attend PT - ok to open brace to 90 degrees once knee motion has reached 90 degrees.    Discontinue brace after 2 weeks if PT says safe and you have achieved >90 degrees of knee bend  Follow-up in 6 weeks for a check of your progress.

## 2023-05-04 NOTE — NURSING NOTE
Chief Complaint   Patient presents with     RECHECK     Right knee progress check // discuss restrictions // pt reports that she has not done PT yet       27 year old  1995             Pain Assessment  Patient Currently in Pain: Denies (no pain per pt)               [facility]  [place], [place] - [address] [place] [address] [phone]        No Known Allergies        Current Outpatient Medications   Medication     acetaminophen (TYLENOL) 325 MG tablet     medroxyPROGESTERone (DEPO-PROVERA) 150 MG/ML IM injection     Current Facility-Administered Medications   Medication     oxyCODONE (ROXICODONE) tablet 5 mg

## 2023-05-04 NOTE — LETTER
5/4/2023         RE: Marichuy Hernandez  [address]  [facility] [address]        Dear Colleague,    Thank you for referring your patient, Marichuy Hernandez, to the [facility] [place]. Please see a copy of my visit note below.    Chief Complaint: Right knee check  Preop diagnosis: Synovial base tumor right knee, suspect synovial hemangioma    3/17/23 Procedure performed: Removal of right synovial base tumor with Partial open synovectomy.    HPI: Marichuy is a 27-year-old young lady here for follow-up 6 weeks status post above procedure by Dr. Klein.  Patient reports that overall she is doing well.  She states that her knee feels better than before surgery.  She is still does have some tenderness around the medial and proximal patella.  Minimal swelling.  She is not taking anything for pain.  She is using her hinged knee brace locked from 0 to 45 degrees.  She has not started physical therapy but is set to start next week.  No other concerns.    Physical Exam: Marichuy is a pleasant 27-year-old female who is alert and oriented no apparent distress.  She has a slight antalgic gait without gait assistance today.  Her brace was removed.  Her left knee incision is well-healed.  There is minimal swelling.  She does have some tenderness palpation over the quadriceps tendon and the VMO.  She has active range of motion of 0 to 75 degrees today.  She is able to perform a straight leg raise with no extensor lag.     Pathology:   Final Diagnosis   A.  SOFT TISSUE, RIGHT KNEE TUMOR, EXCISION:  -Benign vascular malformation.     Impression: 27-year-old female doing well status post intralesional excision of benign vascular malformation of the right knee with quadriceps tendon repair    Plan: Marichuy is doing very well.  I unlocked her brace now to 70 degrees.  Over the next 2 weeks, she should work with physical therapy to increase her motion and then can unlock her brace to 90 degrees  when she has achieved past 90 degrees of flexion.  In 2 weeks, she can discontinue the brace.  She should avoid any running or jumping activities for the next 6 weeks.  I would like to see her back in 6 weeks for check of her motion and strength.  I did explain that we were not able to remove the entire tumor, but we did get most of it.  There is a chance that this could recur.  At the next visit we will determine if she will have a follow-up MRI or just clinical follow-up going forward.  There are also other treatments besides surgery, such as sclerotherapy, that can treat these type of tumors if there is a recurrence.  She understands and agrees with the plan.  All questions answered.      Keren Terrazas PA-C

## 2023-05-04 NOTE — PROGRESS NOTES
Chief Complaint: Right knee check  Preop diagnosis: Synovial base tumor right knee, suspect synovial hemangioma    3/17/23 Procedure performed: Removal of right synovial base tumor with Partial open synovectomy.    HPI: Marichuy is a 27-year-old young lady here for follow-up 6 weeks status post above procedure by Dr. Klein.  Patient reports that overall she is doing well.  She states that her knee feels better than before surgery.  She is still does have some tenderness around the medial and proximal patella.  Minimal swelling.  She is not taking anything for pain.  She is using her hinged knee brace locked from 0 to 45 degrees.  She has not started physical therapy but is set to start next week.  No other concerns.    Physical Exam: Marichuy is a pleasant 27-year-old female who is alert and oriented no apparent distress.  She has a slight antalgic gait without gait assistance today.  Her brace was removed.  Her left knee incision is well-healed.  There is minimal swelling.  She does have some tenderness palpation over the quadriceps tendon and the VMO.  She has active range of motion of 0 to 75 degrees today.  She is able to perform a straight leg raise with no extensor lag.     Pathology:   Final Diagnosis   A.  SOFT TISSUE, RIGHT KNEE TUMOR, EXCISION:  -Benign vascular malformation.     Impression: 27-year-old female doing well status post intralesional excision of benign vascular malformation of the right knee with quadriceps tendon repair    Plan: Marichuy is doing very well.  I unlocked her brace now to 70 degrees.  Over the next 2 weeks, she should work with physical therapy to increase her motion and then can unlock her brace to 90 degrees when she has achieved past 90 degrees of flexion.  In 2 weeks, she can discontinue the brace.  She should avoid any running or jumping activities for the next 6 weeks.  I would like to see her back in 6 weeks for check of her motion and strength.  I did explain that we  were not able to remove the entire tumor, but we did get most of it.  There is a chance that this could recur.  At the next visit we will determine if she will have a follow-up MRI or just clinical follow-up going forward.  There are also other treatments besides surgery, such as sclerotherapy, that can treat these type of tumors if there is a recurrence.  She understands and agrees with the plan.  All questions answered.

## 2023-07-12 ENCOUNTER — TELEPHONE (OUTPATIENT)
Dept: ORTHOPEDICS | Facility: CLINIC | Age: 28
End: 2023-07-12

## 2023-07-12 NOTE — TELEPHONE ENCOUNTER
Called to encourage patient to reschedule appointment with Dr. Klein. Number was not in service.    Shelley Boyd LPN

## 2023-08-21 NOTE — TELEPHONE ENCOUNTER
Called to reschedule no show appointment with Dr. Klein and received a message that VM has not been set up yet.    Shelley Boyd LPN

## 2024-03-09 ENCOUNTER — HEALTH MAINTENANCE LETTER (OUTPATIENT)
Age: 29
End: 2024-03-09

## 2025-03-16 ENCOUNTER — HEALTH MAINTENANCE LETTER (OUTPATIENT)
Age: 30
End: 2025-03-16

## (undated) DEVICE — PREP CHLORAPREP 26ML TINTED ORANGE  260815

## (undated) DEVICE — GLOVE BIOGEL PI MICRO INDICATOR UNDERGLOVE SZ 8.0 48980

## (undated) DEVICE — DRAPE STOCKINETTE IMPERVIOUS 12" 1587

## (undated) DEVICE — DRSG AQUACEL AG HYDROFIBER  3.5X10" 422605

## (undated) DEVICE — SU ETHIBOND 1 CT-1 30" X425H

## (undated) DEVICE — GLOVE BIOGEL PI ULTRATOUCH SZ 7.0 41170

## (undated) DEVICE — LINEN ORTHO PACK 5446

## (undated) DEVICE — GELFOAM 7X12MM

## (undated) DEVICE — SU VICRYL 2-0 CT-1 27" UND J259H

## (undated) DEVICE — SUCTION TIP YANKAUER W/O VENT K86

## (undated) DEVICE — SUCTION MANIFOLD NEPTUNE 2 SYS 1 PORT 702-025-000

## (undated) DEVICE — Device

## (undated) DEVICE — TOURNIQUET SGL  BLADDER 30"X4" BLUE 5921030135

## (undated) DEVICE — GLOVE BIOGEL PI MICRO INDICATOR UNDERGLOVE SZ 7.5 48975

## (undated) DEVICE — GLOVE BIOGEL PI MICRO SZ 7.5 48575

## (undated) DEVICE — ESU GROUND PAD ADULT W/CORD E7507

## (undated) DEVICE — SU MONOCRYL 4-0 PS-2 18" UND Y496G

## (undated) DEVICE — SOL NACL 0.9% IRRIG 500ML BOTTLE 2F7123

## (undated) RX ORDER — ONDANSETRON 2 MG/ML
INJECTION INTRAMUSCULAR; INTRAVENOUS
Status: DISPENSED
Start: 2023-03-17

## (undated) RX ORDER — DEXAMETHASONE SODIUM PHOSPHATE 4 MG/ML
INJECTION, SOLUTION INTRA-ARTICULAR; INTRALESIONAL; INTRAMUSCULAR; INTRAVENOUS; SOFT TISSUE
Status: DISPENSED
Start: 2023-03-17

## (undated) RX ORDER — PROPOFOL 10 MG/ML
INJECTION, EMULSION INTRAVENOUS
Status: DISPENSED
Start: 2023-03-17

## (undated) RX ORDER — CEFAZOLIN SODIUM 2 G/50ML
SOLUTION INTRAVENOUS
Status: DISPENSED
Start: 2023-03-17

## (undated) RX ORDER — EPINEPHRINE 1 MG/ML
INJECTION, SOLUTION INTRAMUSCULAR; SUBCUTANEOUS
Status: DISPENSED
Start: 2023-03-17

## (undated) RX ORDER — FENTANYL CITRATE 50 UG/ML
INJECTION, SOLUTION INTRAMUSCULAR; INTRAVENOUS
Status: DISPENSED
Start: 2023-03-17

## (undated) RX ORDER — GLYCOPYRROLATE 0.2 MG/ML
INJECTION INTRAMUSCULAR; INTRAVENOUS
Status: DISPENSED
Start: 2023-03-17

## (undated) RX ORDER — HYDROMORPHONE HYDROCHLORIDE 1 MG/ML
INJECTION, SOLUTION INTRAMUSCULAR; INTRAVENOUS; SUBCUTANEOUS
Status: DISPENSED
Start: 2023-03-17

## (undated) RX ORDER — BUPIVACAINE HYDROCHLORIDE 2.5 MG/ML
INJECTION, SOLUTION EPIDURAL; INFILTRATION; INTRACAUDAL
Status: DISPENSED
Start: 2023-03-17

## (undated) RX ORDER — OXYCODONE HYDROCHLORIDE 5 MG/1
TABLET ORAL
Status: DISPENSED
Start: 2023-03-17

## (undated) RX ORDER — GABAPENTIN 300 MG/1
CAPSULE ORAL
Status: DISPENSED
Start: 2023-03-17

## (undated) RX ORDER — ACETAMINOPHEN 325 MG/1
TABLET ORAL
Status: DISPENSED
Start: 2023-03-17